# Patient Record
Sex: MALE | Race: WHITE | URBAN - NONMETROPOLITAN AREA
[De-identification: names, ages, dates, MRNs, and addresses within clinical notes are randomized per-mention and may not be internally consistent; named-entity substitution may affect disease eponyms.]

---

## 2019-07-18 ENCOUNTER — HISTORICAL (OUTPATIENT)
Dept: ADMINISTRATIVE | Facility: HOSPITAL | Age: 44
End: 2019-07-18

## 2019-07-29 ENCOUNTER — HISTORICAL (OUTPATIENT)
Dept: ADMINISTRATIVE | Facility: HOSPITAL | Age: 44
End: 2019-07-29

## 2020-12-08 ENCOUNTER — HOSPITAL ENCOUNTER (OUTPATIENT)
Dept: MEDSURG UNIT | Facility: HOSPITAL | Age: 45
End: 2020-12-09
Attending: INTERNAL MEDICINE | Admitting: INTERNAL MEDICINE

## 2020-12-08 LAB
ABS NEUT (OLG): 6.85 X10(3)/MCL (ref 2.1–9.2)
ALBUMIN SERPL-MCNC: 3.4 GM/DL (ref 3.5–5)
ALBUMIN/GLOB SERPL: 0.9 RATIO (ref 1.1–2)
ALP SERPL-CCNC: 45 UNIT/L (ref 40–150)
ALT SERPL-CCNC: 10 UNIT/L (ref 0–55)
AST SERPL-CCNC: 20 UNIT/L (ref 5–34)
BASOPHILS # BLD AUTO: 0 X10(3)/MCL (ref 0–0.2)
BASOPHILS NFR BLD AUTO: 0 %
BILIRUB SERPL-MCNC: 0.8 MG/DL
BILIRUBIN DIRECT+TOT PNL SERPL-MCNC: 0.3 MG/DL (ref 0–0.5)
BILIRUBIN DIRECT+TOT PNL SERPL-MCNC: 0.5 MG/DL (ref 0–0.8)
BUN SERPL-MCNC: 18 MG/DL (ref 8.9–20.6)
CALCIUM SERPL-MCNC: 8.8 MG/DL (ref 8.4–10.2)
CHLORIDE SERPL-SCNC: 99 MMOL/L (ref 98–107)
CK MB SERPL-MCNC: 2.8 NG/ML
CK SERPL-CCNC: 102 U/L (ref 30–200)
CO2 SERPL-SCNC: 29 MMOL/L (ref 22–29)
CREAT SERPL-MCNC: 0.83 MG/DL (ref 0.73–1.18)
D DIMER PPP IA.FEU-MCNC: 0.47 MCG/ML FEU
EOSINOPHIL # BLD AUTO: 0.2 X10(3)/MCL (ref 0–0.9)
EOSINOPHIL NFR BLD AUTO: 2 %
ERYTHROCYTE [DISTWIDTH] IN BLOOD BY AUTOMATED COUNT: 12.6 % (ref 11.5–14.5)
GLOBULIN SER-MCNC: 3.6 GM/DL (ref 2.4–3.5)
GLUCOSE SERPL-MCNC: 137 MG/DL (ref 74–100)
HCO3 UR-SCNC: 30.5 MMOL/L (ref 22–26)
HCT VFR BLD AUTO: 34.4 % (ref 40–51)
HGB BLD-MCNC: 11.3 GM/DL (ref 13.5–17.5)
IMM GRANULOCYTES # BLD AUTO: 0.03 10*3/UL
IMM GRANULOCYTES NFR BLD AUTO: 0 %
LYMPHOCYTES # BLD AUTO: 1.3 X10(3)/MCL (ref 0.6–4.6)
LYMPHOCYTES NFR BLD AUTO: 14 %
MCH RBC QN AUTO: 30.8 PG (ref 26–34)
MCHC RBC AUTO-ENTMCNC: 32.8 GM/DL (ref 31–37)
MCV RBC AUTO: 93.7 FL (ref 80–100)
MONOCYTES # BLD AUTO: 1 X10(3)/MCL (ref 0.1–1.3)
MONOCYTES NFR BLD AUTO: 10 %
NEUTROPHILS # BLD AUTO: 6.85 X10(3)/MCL (ref 2.1–9.2)
NEUTROPHILS NFR BLD AUTO: 72 %
O2 HGB ARTERIAL: 87.4 % (ref 94–100)
PCO2 BLDA: 46 MMHG (ref 35–45)
PH SMN: 7.43 [PH] (ref 7.35–7.45)
PLATELET # BLD AUTO: 252 X10(3)/MCL (ref 130–400)
PMV BLD AUTO: 9.5 FL (ref 7.4–10.4)
PO2 BLDA: 50 MMHG (ref 80–100)
POC ALLENS TEST: POSITIVE
POC BE: 5.4 (ref -2–2)
POC CO HGB: 4.1 %
POC CO2: 31.9 MMOL/L (ref 22–26)
POC MET HGB: 0.9 % (ref 0.4–1.5)
POC SAMPLESOURCE: ABNORMAL
POC SATURATED O2: 92 %
POC SITE: ABNORMAL
POC THB: 12 GM/DL (ref 12–16)
POC TREATMENT: ABNORMAL
POTASSIUM SERPL-SCNC: 3.6 MMOL/L (ref 3.5–5.1)
PROT SERPL-MCNC: 7 GM/DL (ref 6.4–8.3)
RBC # BLD AUTO: 3.67 X10(6)/MCL (ref 4.5–5.9)
SARS-COV-2 RNA RESP QL NAA+PROBE: NOT DETECTED
SODIUM SERPL-SCNC: 136 MMOL/L (ref 136–145)
TROPONIN I SERPL-MCNC: <0.01 NG/ML (ref 0–0.04)
WBC # SPEC AUTO: 9.5 X10(3)/MCL (ref 4.5–11)

## 2020-12-09 LAB
ABS NEUT (OLG): 6.02 X10(3)/MCL (ref 2.1–9.2)
ALBUMIN SERPL-MCNC: 3.3 GM/DL (ref 3.5–5)
ALBUMIN/GLOB SERPL: 0.9 RATIO (ref 1.1–2)
ALP SERPL-CCNC: 51 UNIT/L (ref 40–150)
ALT SERPL-CCNC: 12 UNIT/L (ref 0–55)
AMPHET UR QL SCN: POSITIVE
APPEARANCE, UA: CLEAR
AST SERPL-CCNC: 18 UNIT/L (ref 5–34)
BACTERIA #/AREA URNS AUTO: ABNORMAL /HPF
BARBITURATE SCN PRESENT UR: NEGATIVE
BASOPHILS NFR BLD MANUAL: 1 %
BENZODIAZ UR QL SCN: NEGATIVE
BILIRUB SERPL-MCNC: 0.6 MG/DL
BILIRUB UR QL STRIP: NEGATIVE
BILIRUBIN DIRECT+TOT PNL SERPL-MCNC: 0.3 MG/DL (ref 0–0.5)
BILIRUBIN DIRECT+TOT PNL SERPL-MCNC: 0.3 MG/DL (ref 0–0.8)
BUN SERPL-MCNC: 12 MG/DL (ref 8.9–20.6)
CALCIUM SERPL-MCNC: 8.9 MG/DL (ref 8.4–10.2)
CANNABINOIDS UR QL SCN: NEGATIVE
CHLORIDE SERPL-SCNC: 103 MMOL/L (ref 98–107)
CO2 SERPL-SCNC: 26 MMOL/L (ref 22–29)
COCAINE UR QL SCN: NEGATIVE
COLOR UR: ABNORMAL
CREAT SERPL-MCNC: 0.72 MG/DL (ref 0.73–1.18)
EOSINOPHIL NFR BLD MANUAL: 0 %
ERYTHROCYTE [DISTWIDTH] IN BLOOD BY AUTOMATED COUNT: 12.6 % (ref 11.5–14.5)
GLOBULIN SER-MCNC: 3.7 GM/DL (ref 2.4–3.5)
GLUCOSE (UA): NEGATIVE
GLUCOSE SERPL-MCNC: 149 MG/DL (ref 74–100)
GRANULOCYTES NFR BLD MANUAL: 88 % (ref 43–75)
HCO3 UR-SCNC: 27.9 MMOL/L (ref 22–26)
HCT VFR BLD AUTO: 36 % (ref 40–51)
HGB BLD-MCNC: 11.8 GM/DL (ref 13.5–17.5)
HGB UR QL STRIP: NEGATIVE
HIV 1+2 AB+HIV1 P24 AG SERPL QL IA: NONREACTIVE
HYALINE CASTS #/AREA URNS LPF: ABNORMAL /LPF
IRON SATN MFR SERPL: 21 % (ref 20–50)
IRON SERPL-MCNC: 35 UG/DL (ref 65–175)
KETONES UR QL STRIP: 40 MG/DL
LEUKOCYTE ESTERASE UR QL STRIP: NEGATIVE
LYMPHOCYTES NFR BLD MANUAL: 4 % (ref 20.5–51.1)
MAGNESIUM SERPL-MCNC: 1.74 MG/DL (ref 1.6–2.6)
MCH RBC QN AUTO: 30.9 PG (ref 26–34)
MCHC RBC AUTO-ENTMCNC: 32.8 GM/DL (ref 31–37)
MCV RBC AUTO: 94.2 FL (ref 80–100)
MONOCYTES NFR BLD MANUAL: 4 % (ref 2–9)
NEUTS BAND NFR BLD MANUAL: 3 % (ref 0–10)
NITRITE UR QL STRIP: NEGATIVE
O2 HGB ARTERIAL: 96 % (ref 94–100)
OPIATES UR QL SCN: POSITIVE
PCO2 BLDA: 42 MMHG (ref 35–45)
PCP UR QL: NEGATIVE
PH SMN: 7.43 [PH] (ref 7.35–7.45)
PH UR STRIP.AUTO: 6 [PH] (ref 3–11)
PH UR STRIP: 7.5 [PH] (ref 4.5–8)
PHOSPHATE SERPL-MCNC: 2.9 MG/DL (ref 2.3–4.7)
PLATELET # BLD AUTO: 261 X10(3)/MCL (ref 130–400)
PLATELET # BLD EST: ADEQUATE 10*3/UL
PMV BLD AUTO: 10.2 FL (ref 7.4–10.4)
PO2 BLDA: 207 MMHG (ref 80–100)
POC ALLENS TEST: POSITIVE
POC BE: 3.2 (ref -2–2)
POC CO HGB: 2.4 %
POC CO2: 29.2 MMOL/L (ref 22–26)
POC MET HGB: 1.3 % (ref 0.4–1.5)
POC SAMPLESOURCE: ABNORMAL
POC SATURATED O2: 99.7 %
POC SITE: ABNORMAL
POC THB: 12.1 GM/DL (ref 12–16)
POC TREATMENT: ABNORMAL
POTASSIUM SERPL-SCNC: 4.2 MMOL/L (ref 3.5–5.1)
PROT SERPL-MCNC: 7 GM/DL (ref 6.4–8.3)
PROT UR QL STRIP: NEGATIVE
RBC # BLD AUTO: 3.82 X10(6)/MCL (ref 4.5–5.9)
RBC #/AREA URNS AUTO: ABNORMAL /HPF
RBC MORPH BLD: NORMAL
SETTING 1 ABG: ABNORMAL
SETTING 2 ABG: ABNORMAL
SETTING 3 ABG: ABNORMAL
SODIUM SERPL-SCNC: 137 MMOL/L (ref 136–145)
SP GR UR STRIP: >1.05 (ref 1–1.03)
SQUAMOUS #/AREA URNS LPF: ABNORMAL /LPF
T PALLIDUM AB SER QL: NONREACTIVE
TIBC SERPL-MCNC: 134 UG/DL (ref 69–240)
TIBC SERPL-MCNC: 169 UG/DL (ref 250–450)
TRANSFERRIN SERPL-MCNC: 140 MG/DL (ref 174–364)
UROBILINOGEN UR STRIP-ACNC: NORMAL
WBC # SPEC AUTO: 6.6 X10(3)/MCL (ref 4.5–11)
WBC #/AREA URNS AUTO: ABNORMAL /HPF

## 2020-12-11 LAB — FINAL CULTURE: NO GROWTH

## 2022-04-30 NOTE — ED PROVIDER NOTES
Patient:   Benito Conde             MRN: [id number]            FIN: [id number]               Age:   45 years     Sex:  Male     :  1975   Associated Diagnoses:   SOB (shortness of breath)   Author:   Hellen Petersen      Basic Information   Time seen: Date 2020, Immediately upon arrival.   History source: Patient.   Arrival mode: Private vehicle.   History limitation: None.   Additional information: Chief Complaint from Nursing Triage Note : Chief Complaint   2020 17:45 CST      Chief Complaint           pt c/o cough, sob, chest pain and anxiety, states does IV heroin and meth  .   Provider Assignment.   History of Present Illness   The patient presents with difficulty breathing.  The onset was 2  days ago.  The course/duration of symptoms is constant.  Degree at onset mild.  Degree at present moderate.  The Exacerbating factors is exertion.  The Relieving factors is none.  Risk factors consist of smoking and IV Meth and Heroin user.  Prior episodes: none.  Therapy today: none.  Associated symptoms: chest pain, fever, chills, cough, denies nausea, denies vomiting, denies abdominal pain, denies back pain and denies weight gain.  Additional history: 44 y/o  male presents to the ED with complaints of shortness of breath, chest pain, wheezing, cough, fever, and chills x 2 days. States he is a IV heroin and meth user. States he smokes 1 PPD. States he got very short of breath last night and began wheezing and that caused him to have a panic attack. .        Review of Systems   Constitutional symptoms:  Fever, chills.    Skin symptoms:  Rash.   ENMT symptoms:  Nasal congestion, no ear pain, no sore throat, no sinus pain.    Respiratory symptoms:  Shortness of breath, cough, wheezing, No orthopnea,    Cardiovascular symptoms:  No chest pain, no palpitations, no peripheral edema.    Gastrointestinal symptoms:  No abdominal pain, no nausea, no vomiting, no diarrhea.    Musculoskeletal  symptoms:  No back pain, no Muscle pain, no Joint pain.    Neurologic symptoms:  No headache, no dizziness, no weakness.    Psychiatric symptoms:  Anxiety, substance abuse, No depression,       Health Status   Allergies:    Allergic Reactions (All)  No Known Allergies,    Allergies (1) Active Reaction  No Known Allergies None Documented  .      Past Medical/ Family/ Social History   Medical history:    No active or resolved past medical history items have been selected or recorded., Reviewed as documented in chart.   Surgical history:    No active procedure history items have been selected or recorded., Reviewed as documented in chart.   Family history:    No family history items have been selected or recorded., Reviewed as documented in chart.   Social history:    Social & Psychosocial Habits    Substance Use  06/29/2019  Type: Heroin, Methamphetamines    Has drug use interfered with your work or home life? Yes    Tobacco  06/29/2019  Use: 10 or more cigarettes (1/    Patient Wants Consult For Cessation Counseling No    12/08/2020  Use: 10 or more cigarettes (1/    Patient Wants Consult For Cessation Counseling No    Abuse/Neglect  06/29/2019  SHX Any signs of abuse or neglect No    12/08/2020  SHX Any signs of abuse or neglect No  , Reviewed as documented in chart.   Problem list:    Active Problems (2)  Drug abuse   Tobacco user   .      Physical Examination               Vital Signs   Vital Signs   12/8/2020 18:30 CST      Peripheral Pulse Rate     90 bpm                             Respiratory Rate          21 br/min                             SpO2                      95 %                             Oxygen Therapy            Room air                             Systolic Blood Pressure   109 mmHg                             Diastolic Blood Pressure  65 mmHg                             Mean Arterial Pressure, Cuff              80 mmHg    12/8/2020 17:45 CST      Temperature Temporal Artery               36.9  DegC                             Peripheral Pulse Rate     105 bpm  HI                             Respiratory Rate          20 br/min                             SpO2                      96 %                             Oxygen Therapy            Room air                             Systolic Blood Pressure   104 mmHg                             Diastolic Blood Pressure  71 mmHg  .      Vital Signs (last 24 hrs)_____  Last Charted___________  Heart Rate Peripheral   90 bpm  (DEC 08 18:30)  Resp Rate         21 br/min  (DEC 08 18:30)  SBP      109 mmHg  (DEC 08 18:30)  DBP      65 mmHg  (DEC 08 18:30)  SpO2      95 %  (DEC 08 18:30)  Weight      62 kg  (DEC 08 17:45)  Height      178 cm  (DEC 08 17:45)  BMI      19.57  (DEC 08 17:45)  .   Measurements   12/8/2020 17:45 CST      Weight Dosing             62 kg                             Weight Measured           62 kg                             Weight Measured and Calculated in Lbs     136.69 lb                             Weight Estimated          62 kg                             Height/Length Dosing      178 cm                             Height/Length Measured    178 cm                             Height/Length Estimated   178 cm                             Body Mass Index Estimated 19.57 kg/m2                             Body Mass Index Measured  19.57 kg/m2  .   Basic Oxygen Information   12/8/2020 18:30 CST      SpO2                      95 %                             Oxygen Therapy            Room air    12/8/2020 17:45 CST      SpO2                      96 %                             Oxygen Therapy            Room air  .   General:  Alert, no acute distress.    Skin:  Warm, intact, normal for ethnicity.    Head:  Normocephalic, atraumatic.    Neck:  Supple, trachea midline, no tenderness, no JVD.    Eye:  Pupils are equal, round and reactive to light.   Ears, nose, mouth and throat:  Tympanic membranes clear, oral mucosa moist, no pharyngeal erythema or  exudate.    Cardiovascular:  Regular rate and rhythm, No murmur, Normal peripheral perfusion, No edema.    Respiratory:  Respirations are non-labored, breath sounds are equal, Symmetrical chest wall expansion, Breath sounds: Wheezes present (moderate, expiratory wheezes).    Gastrointestinal:  Soft, Nontender, Non distended, Normal bowel sounds.    Back:  Nontender, Normal range of motion.    Neurological:  Alert and oriented to person, place, time, and situation, No focal neurological deficit observed.       Medical Decision Making   Documents reviewed:  Emergency department nurses' notes.   Electrocardiogram:  Time 12/8/2020 17:53:00, rate 90, normal sinus rhythm, No ST-T changes, no ectopy.    Results review:  Lab results : Lab View   12/8/2020 19:02 CST      Est Creat Clearance Ser   115.11 mL/min    12/8/2020 18:30 CST      Sodium Lvl                136 mmol/L                             Potassium Lvl             3.6 mmol/L                             Chloride                  99 mmol/L                             CO2                       29 mmol/L                             Calcium Lvl               8.8 mg/dL                             Glucose Lvl               137 mg/dL  HI                             BUN                       18.0 mg/dL                             Creatinine                0.83 mg/dL                             eGFR-AA                   >105                             eGFR-MONA                  >105 mL/min/1.73 m2                             Bili Total                0.8 mg/dL                             Bili Direct               0.3 mg/dL                             Bili Indirect             0.50 mg/dL                             AST                       20 unit/L                             ALT                       10 unit/L                             Alk Phos                  45 unit/L                             Total Protein             7.0 gm/dL                              Albumin Lvl               3.4 gm/dL  LOW                             Globulin                  3.6 gm/dL  HI                             A/G Ratio                 0.9 ratio  LOW                             Total CK                  102 U/L                             CK MB                     2.8 ng/mL                             Troponin-I                <0.010 ng/mL                             D-Dimer                   0.47 mcg/ml FEU                             WBC                       9.5 x10(3)/mcL                             RBC                       3.67 x10(6)/mcL  LOW                             Hgb                       11.3 gm/dL  LOW                             Hct                       34.4 %  LOW                             Platelet                  252 x10(3)/mcL                             MCV                       93.7 fL                             MCH                       30.8 pg                             MCHC                      32.8 gm/dL                             RDW                       12.6 %                             MPV                       9.5 fL                             Abs Neut                  6.85 x10(3)/mcL                             Neutro Auto               72 %  NA                             Lymph Auto                14 %  NA                             Mono Auto                 10 %  NA                             Eos Auto                  2 %  NA                             Abs Eos                   0.2 x10(3)/mcL                             Basophil Auto             0 %  NA                             Abs Neutro                6.85 x10(3)/mcL                             Abs Lymph                 1.3 x10(3)/mcL                             Abs Mono                  1.0 x10(3)/mcL                             Abs Baso                  0.0 x10(3)/mcL                             IG%                       0 %  NA                             IG#                        0.030  NA  ,    Labs (Last four charted values)  WBC                  9.5 (DEC 08)   Hgb                  L 11.3 (DEC 08)   Hct                  L 34.4 (DEC 08)   Plt                  252 (DEC 08)   Na                   136 (DEC 08)   K                    3.6 (DEC 08)   CO2                  29 (DEC 08)   Cl                   99 (DEC 08)   Cr                   0.83 (DEC 08)   BUN                  18.0 (DEC 08)   Glucose Random       H 137 (DEC 08) .    Radiology results:  Reported at  12/8/2020 18:31:00, X-ray, chest, reviewed radiologist's report, interpretation:  No acute cardiopulmonary process, Rad Results (ST)  < 12 hrs   Accession: [id number]  Order: XR Chest 1 View  Report Dt/Tm: 12/08/2020 18:31  Report:   EXAM: XR Chest 1 View  DATE: 12/8/2020 6:26 PM CST  INDICATION: Cough     COMPARISON: Chest radiograph 4/20/2020     TECHNIQUE: Frontal views of the chest.        FINDINGS:   The cardiomediastinal silhouette is normal in size and contour.  Pulmonary vascularity is within normal limits. The lungs are  well-inflated and are without focal consolidation, pleural effusion,  or pneumothorax.     The visualized upper abdominal gas pattern is within normal limits.  The imaged osseous structures and soft tissues are without acute  abnormality and are stable in appearance.        IMPRESSION:  No acute cardiopulmonary process.    .       Reexamination/ Reevaluation   Vital signs   Basic Oxygen Information   12/8/2020 18:30 CST      SpO2                      95 %                             Oxygen Therapy            Room air    12/8/2020 17:45 CST      SpO2                      96 %                             Oxygen Therapy            Room air     Interventions: Order Profile (Selected)   Inpatient Orders  Completed  Narcan: 1.2 mg, form: Injection, Nasal, Once, first dose 12/08/20 20:26:00 CST, stop date 12/08/20 20:26:00 CST, STAT  albuterol 90 mcg/inh inhalation aerosol: 8 puff(s), 720 mcg =, form: Inhaler, INH,  Once, first dose 12/08/20 19:26:00 CST, stop date 12/08/20 19:26:00 CST, STAT, Waste Code SP.      Impression and Plan   Diagnosis   SOB (shortness of breath) (CFC77-MS R06.02)   Plan   Condition: Stable.    Disposition: Patient care transitioned to (Transitioned patient to Dr. Raines at 21:23): Time: 12/8/2020 19:00:00, Hellen Petersen.

## 2022-04-30 NOTE — ED PROVIDER NOTES
Patient:   Benito Conde             MRN: [id number]            FIN: [id number]               Age:   45 years     Sex:  Male     :  1975   Associated Diagnoses:   None   Author:   Nadja Currie PA-C      Basic Information   Time seen: Immediately upon arrival.   History source: Patient.   Arrival mode: Private vehicle.   History limitation: None.   Additional information: Chief Complaint from Nursing Triage Note : Chief Complaint   2020 17:45 CST      Chief Complaint           pt c/o cough, sob, chest pain and anxiety, states does IV heroin and meth  .   Provider/Visit info:   Time Seen:  Nadja Currie PA-C / 2020 17:40  .   History of Present Illness   The patient presents with difficulty breathing.  The onset was 2  days ago.  The course/duration of symptoms is constant.  Degree at onset mild.  Degree at present moderate.  The Exacerbating factors is exertion.  The Relieving factors is none.  Risk factors consist of smoking and IV Meth and Heroin user.  Prior episodes: none.  Therapy today: none.  Associated symptoms: chest pain, fever, chills, cough, denies nausea, denies vomiting, denies abdominal pain, denies back pain and denies weight gain.  Additional history: 44 y/o  male presents to the ED with complaints of shortness of breath, chest pain, wheezing, cough, fever, and chills x 2 days. States he is a IV heroin and meth user. States he smokes 1 PPD. States he got very short of breath last night and began wheezing and that caused him to have a panic attack. .        Review of Systems   Constitutional symptoms:  Fever, chills.    Skin symptoms:  Rash.   ENMT symptoms:  Nasal congestion, no ear pain, no sore throat, no sinus pain.    Respiratory symptoms:  Shortness of breath, cough, wheezing, No orthopnea,    Cardiovascular symptoms:  No chest pain, no palpitations, no peripheral edema.    Gastrointestinal symptoms:  No abdominal pain, no nausea, no  vomiting, no diarrhea.    Musculoskeletal symptoms:  No back pain, no Muscle pain, no Joint pain.    Neurologic symptoms:  No headache, no dizziness, no weakness.    Psychiatric symptoms:  Anxiety, substance abuse, No depression,       Health Status   Allergies:    Allergic Reactions (All)  No Known Allergies,    Allergies (1) Active Reaction  No Known Allergies None Documented  .      Past Medical/ Family/ Social History   Medical history:    No active or resolved past medical history items have been selected or recorded., Reviewed as documented in chart.   Surgical history:    No active procedure history items have been selected or recorded., Reviewed as documented in chart.   Family history:    No family history items have been selected or recorded., Reviewed as documented in chart.   Social history:    Social & Psychosocial Habits    Substance Use  06/29/2019  Type: Heroin, Methamphetamines    Has drug use interfered with your work or home life? Yes    Tobacco  06/29/2019  Use: 10 or more cigarettes (1/    Patient Wants Consult For Cessation Counseling No    12/08/2020  Use: 10 or more cigarettes (1/    Patient Wants Consult For Cessation Counseling No    Abuse/Neglect  06/29/2019  SHX Any signs of abuse or neglect No    12/08/2020  SHX Any signs of abuse or neglect No  , Reviewed as documented in chart.   Problem list:    Active Problems (2)  Drug abuse   Tobacco user   .      Physical Examination               Vital Signs   Vital Signs   12/8/2020 18:30 CST      Peripheral Pulse Rate     90 bpm                             Respiratory Rate          21 br/min                             SpO2                      95 %                             Oxygen Therapy            Room air                             Systolic Blood Pressure   109 mmHg                             Diastolic Blood Pressure  65 mmHg                             Mean Arterial Pressure, Cuff              80 mmHg    12/8/2020 17:45 CST       Temperature Temporal Artery               36.9 DegC                             Peripheral Pulse Rate     105 bpm  HI                             Respiratory Rate          20 br/min                             SpO2                      96 %                             Oxygen Therapy            Room air                             Systolic Blood Pressure   104 mmHg                             Diastolic Blood Pressure  71 mmHg  .      Vital Signs (last 24 hrs)_____  Last Charted___________  Heart Rate Peripheral   90 bpm  (DEC 08 18:30)  Resp Rate         21 br/min  (DEC 08 18:30)  SBP      109 mmHg  (DEC 08 18:30)  DBP      65 mmHg  (DEC 08 18:30)  SpO2      95 %  (DEC 08 18:30)  Weight      62 kg  (DEC 08 17:45)  Height      178 cm  (DEC 08 17:45)  BMI      19.57  (DEC 08 17:45)  .   Measurements   12/8/2020 17:45 CST      Weight Dosing             62 kg                             Weight Measured           62 kg                             Weight Measured and Calculated in Lbs     136.69 lb                             Weight Estimated          62 kg                             Height/Length Dosing      178 cm                             Height/Length Measured    178 cm                             Height/Length Estimated   178 cm                             Body Mass Index Estimated 19.57 kg/m2                             Body Mass Index Measured  19.57 kg/m2  .   Basic Oxygen Information   12/8/2020 18:30 CST      SpO2                      95 %                             Oxygen Therapy            Room air    12/8/2020 17:45 CST      SpO2                      96 %                             Oxygen Therapy            Room air  .   General:  Alert, no acute distress.    Skin:  Warm, intact, normal for ethnicity.    Head:  Normocephalic, atraumatic.    Neck:  Supple, trachea midline, no tenderness, no JVD.    Eye:  Pupils are equal, round and reactive to light.   Ears, nose, mouth and throat:  Tympanic membranes  clear, oral mucosa moist, no pharyngeal erythema or exudate.    Cardiovascular:  Regular rate and rhythm, No murmur, Normal peripheral perfusion, No edema.    Respiratory:  Respirations are non-labored, breath sounds are equal, Symmetrical chest wall expansion, Breath sounds: Wheezes present (moderate, expiratory wheezes).    Gastrointestinal:  Soft, Nontender, Non distended, Normal bowel sounds.    Back:  Nontender, Normal range of motion.    Neurological:  Alert and oriented to person, place, time, and situation, No focal neurological deficit observed.       Medical Decision Making   Documents reviewed:  Emergency department nurses' notes.   Electrocardiogram:  Time 12/8/2020 17:53:00, rate 90, normal sinus rhythm, No ST-T changes, no ectopy.    Results review:     No qualifying data available.      Reexamination/ Reevaluation   Vital signs   Basic Oxygen Information   12/8/2020 18:30 CST      SpO2                      95 %                             Oxygen Therapy            Room air    12/8/2020 17:45 CST      SpO2                      96 %                             Oxygen Therapy            Room air        Impression and Plan   Plan   Condition: Stable.    Disposition: Medically cleared, Patient care transitioned to: Time: 12/8/2020 19:00:00, Hellen Petersen.    Counseled: Patient, Regarding diagnosis, Regarding diagnostic results, Regarding treatment plan, Regarding prescription, Patient indicated understanding of instructions.

## 2022-04-30 NOTE — ED PROVIDER NOTES
Patient:   Benito Conde             MRN: [id number]            FIN: [id number]               Age:   45 years     Sex:  Male     :  1975   Associated Diagnoses:   SOB (shortness of breath); Acute bronchitis; Hypoxemia   Author:   Mal Raines MD      Basic Information   Time seen: Date 2020, Immediately upon arrival.   History source: Patient.   Arrival mode: Private vehicle.   History limitation: None.   Additional information: Chief Complaint from Nursing Triage Note : Chief Complaint   2020 17:45 CST      Chief Complaint           pt c/o cough, sob, chest pain and anxiety, states does IV heroin and meth  .   Provider Assignment.   History of Present Illness   The patient presents with difficulty breathing.  The onset was 2  days ago.  The course/duration of symptoms is constant.  Degree at onset mild.  Degree at present moderate.  The Exacerbating factors is exertion.  The Relieving factors is none.  Risk factors consist of smoking and IV Meth and Heroin user.  Prior episodes: none.  Therapy today: none.  Associated symptoms: chest pain, fever, chills, cough, denies nausea, denies vomiting, denies abdominal pain, denies back pain and denies weight gain.  Additional history: 46 y/o  male presents to the ED with complaints of shortness of breath, chest pain, wheezing, cough, fever, and chills x 2 days. States he is a IV heroin and meth user. States he smokes 1 PPD. States he got very short of breath last night and began wheezing and that caused him to have a panic attack. .        Review of Systems   Constitutional symptoms:  Fever, chills.    Skin symptoms:  Rash.   ENMT symptoms:  Nasal congestion, no ear pain, no sore throat, no sinus pain.    Respiratory symptoms:  Shortness of breath, cough, wheezing, No orthopnea,    Cardiovascular symptoms:  No chest pain, no palpitations, no peripheral edema.    Gastrointestinal symptoms:  No abdominal pain, no nausea, no  vomiting, no diarrhea.    Musculoskeletal symptoms:  No back pain, no Muscle pain, no Joint pain.    Neurologic symptoms:  No headache, no dizziness, no weakness.    Psychiatric symptoms:  Anxiety, substance abuse, No depression,       Health Status   Allergies:    Allergic Reactions (All)  No Known Allergies,    Allergies (1) Active Reaction  No Known Allergies None Documented  .   Medications:  (Selected)   Inpatient Medications  Ordered  DuoNeb 0.5 mg-2.5 mg/3 mL inhalation solution: 9 mL, form: Soln, NEB, Now, first dose 12/08/20 21:34:00 CST, stop date 12/08/20 21:34:00 CST, STAT  IVF Normal Saline NS Bolus 1000ml: 1,000 mL, 1,000 mL, IV, Once, 1,000 mL/hr, start date 12/08/20 21:35:00 CST, stop date 12/08/20 21:35:00 CST, STAT.      Past Medical/ Family/ Social History   Medical history:    No active or resolved past medical history items have been selected or recorded., Reviewed as documented in chart.   Surgical history:    No active procedure history items have been selected or recorded., Reviewed as documented in chart.   Family history:    No family history items have been selected or recorded., Reviewed as documented in chart.   Social history:    Social & Psychosocial Habits    Substance Use  06/29/2019  Type: Heroin, Methamphetamines    Has drug use interfered with your work or home life? Yes    Tobacco  06/29/2019  Use: 10 or more cigarettes (1/    Patient Wants Consult For Cessation Counseling No    12/08/2020  Use: 10 or more cigarettes (1/    Patient Wants Consult For Cessation Counseling No    Abuse/Neglect  06/29/2019  SHX Any signs of abuse or neglect No    12/08/2020  SHX Any signs of abuse or neglect No  , Reviewed as documented in chart.   Problem list:    Active Problems (2)  Drug abuse   Tobacco user   .      Physical Examination               Vital Signs   Vital Signs   12/8/2020 21:05 CST      SpO2                      93 %  LOW    12/8/2020 20:42 CST      Peripheral Pulse Rate     92 bpm                              Respiratory Rate          17 br/min                             SpO2                      94 %                             Oxygen Therapy            Room air    12/8/2020 20:19 CST      Peripheral Pulse Rate     84 bpm                             Respiratory Rate          19 br/min                             SpO2                      93 %  LOW                             Oxygen Therapy            Room air                             Systolic Blood Pressure   100 mmHg                             Diastolic Blood Pressure  60 mmHg                             Mean Arterial Pressure, Cuff              73 mmHg    12/8/2020 19:26 CST      Peripheral Pulse Rate     92 bpm                             SpO2                      93 %  LOW                             Systolic Blood Pressure   123 mmHg                             Diastolic Blood Pressure  73 mmHg    12/8/2020 18:30 CST      Peripheral Pulse Rate     90 bpm                             Respiratory Rate          21 br/min                             SpO2                      95 %                             Oxygen Therapy            Room air                             Systolic Blood Pressure   109 mmHg                             Diastolic Blood Pressure  65 mmHg                             Mean Arterial Pressure, Cuff              80 mmHg    12/8/2020 17:45 CST      Temperature Temporal Artery               36.9 DegC                             Peripheral Pulse Rate     105 bpm  HI                             Respiratory Rate          20 br/min                             SpO2                      96 %                             Oxygen Therapy            Room air                             Systolic Blood Pressure   104 mmHg                             Diastolic Blood Pressure  71 mmHg  .      Vital Signs (last 24 hrs)_____  Last Charted___________  Heart Rate Peripheral   92 bpm  (DEC 08 20:42)  Resp Rate         17 br/min  (DEC 08  20:42)  SBP      100 mmHg  (DEC 08 20:19)  DBP      60 mmHg  (DEC 08 20:19)  SpO2      L 93%  (DEC 08 21:05)  Weight      62 kg  (DEC 08 17:45)  Height      178 cm  (DEC 08 17:45)  BMI      19.57  (DEC 08 17:45)  .   Measurements   12/8/2020 17:45 CST      Weight Dosing             62 kg                             Weight Measured           62 kg                             Weight Measured and Calculated in Lbs     136.69 lb                             Weight Estimated          62 kg                             Height/Length Dosing      178 cm                             Height/Length Measured    178 cm                             Height/Length Estimated   178 cm                             Body Mass Index Estimated 19.57 kg/m2                             Body Mass Index Measured  19.57 kg/m2  .   Basic Oxygen Information   12/8/2020 21:05 CST      SpO2                      93 %  LOW    12/8/2020 20:42 CST      SpO2                      94 %                             Oxygen Therapy            Room air    12/8/2020 20:19 CST      SpO2                      93 %  LOW                             Oxygen Therapy            Room air    12/8/2020 19:26 CST      SpO2                      93 %  LOW    12/8/2020 18:30 CST      SpO2                      95 %                             Oxygen Therapy            Room air    12/8/2020 17:45 CST      SpO2                      96 %                             Oxygen Therapy            Room air  .   General:  Alert, no acute distress.    Skin:  Warm, intact, normal for ethnicity.    Head:  Normocephalic, atraumatic.    Neck:  Supple, trachea midline, no tenderness, no JVD.    Eye:  Pupils are equal, round and reactive to light.   Ears, nose, mouth and throat:  Tympanic membranes clear, oral mucosa moist, no pharyngeal erythema or exudate.    Cardiovascular:  Regular rate and rhythm, No murmur, Normal peripheral perfusion, No edema.    Respiratory:  Respirations are non-labored,  breath sounds are equal, Symmetrical chest wall expansion, Breath sounds: Wheezes present (moderate, expiratory wheezes).    Gastrointestinal:  Soft, Nontender, Non distended, Normal bowel sounds.    Back:  Nontender, Normal range of motion.    Neurological:  Alert and oriented to person, place, time, and situation, No focal neurological deficit observed.       Medical Decision Making   Documents reviewed:  Emergency department nurses' notes.   Electrocardiogram:  Time 12/8/2020 17:53:00, rate 90, normal sinus rhythm, No ST-T changes, no ectopy.    Results review:  Lab results : Lab View   12/8/2020 19:02 CST      Est Creat Clearance Ser   115.11 mL/min    12/8/2020 18:30 CST      Sodium Lvl                136 mmol/L                             Potassium Lvl             3.6 mmol/L                             Chloride                  99 mmol/L                             CO2                       29 mmol/L                             Calcium Lvl               8.8 mg/dL                             Glucose Lvl               137 mg/dL  HI                             BUN                       18.0 mg/dL                             Creatinine                0.83 mg/dL                             eGFR-AA                   >105                             eGFR-MONA                  >105 mL/min/1.73 m2                             Bili Total                0.8 mg/dL                             Bili Direct               0.3 mg/dL                             Bili Indirect             0.50 mg/dL                             AST                       20 unit/L                             ALT                       10 unit/L                             Alk Phos                  45 unit/L                             Total Protein             7.0 gm/dL                             Albumin Lvl               3.4 gm/dL  LOW                             Globulin                  3.6 gm/dL  HI                             A/G Ratio                  0.9 ratio  LOW                             Total CK                  102 U/L                             CK MB                     2.8 ng/mL                             Troponin-I                <0.010 ng/mL                             D-Dimer                   0.47 mcg/ml FEU                             WBC                       9.5 x10(3)/mcL                             RBC                       3.67 x10(6)/mcL  LOW                             Hgb                       11.3 gm/dL  LOW                             Hct                       34.4 %  LOW                             Platelet                  252 x10(3)/mcL                             MCV                       93.7 fL                             MCH                       30.8 pg                             MCHC                      32.8 gm/dL                             RDW                       12.6 %                             MPV                       9.5 fL                             Abs Neut                  6.85 x10(3)/mcL                             Neutro Auto               72 %  NA                             Lymph Auto                14 %  NA                             Mono Auto                 10 %  NA                             Eos Auto                  2 %  NA                             Abs Eos                   0.2 x10(3)/mcL                             Basophil Auto             0 %  NA                             Abs Neutro                6.85 x10(3)/mcL                             Abs Lymph                 1.3 x10(3)/mcL                             Abs Mono                  1.0 x10(3)/mcL                             Abs Baso                  0.0 x10(3)/mcL                             IG%                       0 %  NA                             IG#                       0.030  NA  , Lab results : Lab [facility]   12/8/2020 21:40 CST      Sample ABG                arterial                             Treatment                 room air                              Site                      Radial Rt                             pH Art                    7.43                             pCO2 Art                  46.0 mmHg  HI                             pO2 Art                   50.0 mmHg  CRIT                             HCO3 Art                  30.5 mmol/L  HI                             CO2 Totl Art              31.9 mmol/L  HI                             O2 Sat Art                92.0 %                             D base                    5.4  HI                             THB ABG                   12.0 gm/dL                             CO Hgb                    4.1 %  NA                             Met Hgb Art               0.9 %                             O2 Hgb                    87.4 %  LOW                             Allens                    Positive  , Lab results : Lab [facility]   12/8/2020 18:29 CST      SARS-CoV-2 PCR            Not Detected  .    Radiology results:  Reported at  12/8/2020 18:31:00, X-ray, chest, reviewed radiologist's report, interpretation:  No acute cardiopulmonary process, Rad Results (ST)  < 12 hrs   Accession: [id number]  Order: XR Chest 1 View  Report Dt/Tm: 12/08/2020 18:31  Report:   EXAM: XR Chest 1 View  DATE: 12/8/2020 6:26 PM CST  INDICATION: Cough     COMPARISON: Chest radiograph 4/20/2020     TECHNIQUE: Frontal views of the chest.        FINDINGS:   The cardiomediastinal silhouette is normal in size and contour.  Pulmonary vascularity is within normal limits. The lungs are  well-inflated and are without focal consolidation, pleural effusion,  or pneumothorax.     The visualized upper abdominal gas pattern is within normal limits.  The imaged osseous structures and soft tissues are without acute  abnormality and are stable in appearance.        IMPRESSION:  No acute cardiopulmonary process.    .       Reexamination/ Reevaluation   Time: 12/8/2020 21:00:00 .   Notes: Care transitioned from [facility] to  myself, Mal Raines MD, at  9 PM.  Patient still has expiratory wheezing and rhonchi throughout the lung fields.  No acute findings on chest x-ray.  Patient's oxygen saturation 87% on room air.  Will obtain an inpatient Covid for further evaluation as well as provide nebulizer treatments and steroids as this appears to be more of COPD related.  We will continue to monitor.  .   Time: 12/8/2020 23:37:00 .   Notes: Patient currently in no acute distress.  Patient saturating well on 3 L.  When moved to room air, oxygen saturation diminishes to 87%.  On patient's ABG, PO2 of 50 consistent with where the patient is currently at clinically.  Patient has expiratory wheezing and rhonchi which appears to be more consistent with bronchitis, but may have some underlying COPD.  Due to persistent hypoxia, internal medicine has been consulted for observation admission..      Impression and Plan   Diagnosis   SOB (shortness of breath) (KDN86-QF R06.02)   Acute bronchitis (LKL01-ZI J20.9)   Hypoxemia (OKN07-LW R09.02)      Calls-Consults   -  12/8/2020 23:37:00 , Internal Medicine, consult.    Plan   Condition: Stable.    Disposition: Admit time  12/8/2020 23:38:00, Admit to Inpatient Telemetry Unit.    Counseled: Patient, Regarding diagnosis, Regarding treatment plan, Regarding prescription, Patient indicated understanding of instructions.

## 2022-05-04 NOTE — HISTORICAL [facility] CERNER
This is a historical note converted from [place]. Formatting and pictures may have been removed.  Please reference [place] for original formatting and attached multimedia. Admit and Discharge Dates  Admit Date: 12/09/2020  Discharge Date: 12/09/2020  Physicians  Attending Physician - Juanjose LINDSEY, Beatrice  Admitting Physician - Juanjose LINDSEY, Beatrice  Primary Care Physician - No PCP, No  Discharge Diagnosis  Acute bronchitis?J20.9  Hypoxemia?R09.02  Shortness of breath?[id number]  SOB (shortness of breath)?R06.02  Admission Information  Patient is a 45-year-old male with history of panic attacks, depression,?and polysubstance abuse presenting to ER with complaints of shortness of breath on day of presentation. ?Patient describes having panic attacks which resolve with 2-3 treatments of albuterol inhaler each week.??Given that patients exacerbations are relieved with albuterol, likely has been diagnosed with panic attacks. ?Patient has never been diagnosed with asthma or COPD. ?Of note patient recently abused heroin and meth on Monday and Tuesday, 1 day prior to ER presentation. On morning of Dec 8,? patient endorses using his albuterol inhaler 8 times without any relief, prompting him to come to the ER.??Patient does endorse chest pain but more so with coughing, productive cough with yellow to green mucus, dizziness upon coughing but without any syncopal episodes. ?Patient has a significant history of abusing heroin on nearly a daily basis for 6-7 years, and abusing meth nearly 1-2 times weekly for the past 10 years. ?In addition has a significant 26-pack-year history of tobacco usage, but negative for vaping. ?In regards to occupational exposure, patient has been a  since his teenage years. ?Patient denies nausea, vomiting, diarrhea, abdominal pain, hematuria, hemoptysis, syncope. ?Medicine consulted for hypoxic hypercapnic?respiratory failure down to 93% on room air requiring 3 L  nasal cannula.?In the ER received one dose of decadron, narcan, and duonebs. Labs unremarkable, troponin negative. UDS positive for opiates and amphetamines.  Hospital Course  Received phone call from nursing that patient wanted to leave AMA. Patient stated that he would eat his breakfast and wait for us to come to see him. Attempted to see patient but patient not in room.? Patient eloped.       Reviewed chart. ?Agree with all the above findings.

## 2022-05-04 NOTE — HISTORICAL [facility] CERNER
This is a historical note converted from [facility]. Formatting and pictures may have been removed.  Please reference [facility] for original formatting and attached multimedia. Chief Complaint  pt c/o cough, sob, chest pain and anxiety, states does IV heroin and meth  History of Present Illness  Patient is a 45-year-old male with history of panic attacks, depression,?and polysubstance abuse presenting to ER with complaints of shortness of breath on day of presentation. ?Patient describes having panic attacks which resolve with 2-3 treatments of albuterol inhaler each week.??Given that patients exacerbations are relieved with albuterol, likely has been diagnosed with panic attacks. ?Patient has never been diagnosed with asthma or COPD. ?Of note patient recently abused heroin and meth on Monday and Tuesday, 1 day prior to ER presentation. On morning of Dec 8,? patient endorses using his albuterol inhaler 8 times without any relief, prompting him to come to the ER.??Patient does endorse chest pain but more so with coughing, productive cough with yellow to green mucus, dizziness upon coughing but without any syncopal episodes. ?Patient has a significant history of abusing heroin on nearly a daily basis for 6-7 years, and abusing meth nearly 1-2 times weekly for the past 10 years. ?In addition has a significant 26-pack-year history of tobacco usage, but negative for vaping. ?In regards to occupational exposure, patient has been a  since his teenage years. ?Patient denies nausea, vomiting, diarrhea, abdominal pain, hematuria, hemoptysis, syncope. ?Medicine consulted for hypoxic hypercapnic?respiratory failure down to 93% on room air requiring 3 L nasal cannula.?In the ER received one dose of decadron, narcan, and duonebs. Labs unremarkable, troponin negative. UDS positve for opiates and amphetamines.  ?  PMH: panic attacks (misdiagnosis), tobacco usage, polysubstance abuse, depression  Past Surgical Hx: none  Fam Hx:  none  Meds: Albuterol inhaler, tylenol PRN, Aspirin; was prescribed lexapro in the past years ago  Social History: nearly daily heroin user for the past 6-7 years; meth use atleast twice weekly for the past 10 years; cocaine and ecstasy last abused as a young adult; no marijuana ; alcohol usage years ago; 26 pack year history for tobacco; no vaping; occupation is welding  ?  Review of Systems  Constitutional:?no fever, fatigue, weakness  Eye:?no vision loss, eye redness, drainage, or pain  ENMT:?no sore throat, ear pain, sinus pain/congestion, nasal congestion/drainage  Respiratory:?+ cough,?+ wheezing,?+ shortness of breath  Cardiovascular:?+ chest pain, no palpitations, no edema  Gastrointestinal:?no nausea, vomiting, or diarrhea. No abdominal pain  Genitourinary:?no dysuria, no urinary frequency or urgency, no hematuria  Hema/Lymph:?no abnormal bruising or bleeding  Endocrine:?no heat or cold intolerance, no excessive thirst or excessive urination  Musculoskeletal:?no muscle or joint pain, no joint swelling  Integumentary:?no skin rash or abnormal lesion  Neurologic: no headache, no dizziness, no weakness or numbness  ?  Physical Exam  Vitals & Measurements  T:?36.3? ?C (Oral)? TMIN:?36.3? ?C (Oral)? TMAX:?37.3? ?C (Oral)? HR:?78(Peripheral)? RR:?16? BP:?118/61? SpO2:?95%? WT:?62?kg? BMI:?19.57?  General:?cachetic thin male in mild distress wearing NC  HENT:?normocephalic, atraumatic  Neck: full range of motion, no thyromegaly  Respiratory:?significant coarse rhonchi and expiratory wheezing bilaterally, coughing on exam  Cardiovascular:?regular rate and rhythm without murmurs, gallops or rubs; no peripheral edema  Gastrointestinal:?soft, non-tender, non-distended with normal bowel sounds, without masses to palpation  Musculoskeletal:?full range of motion of all extremities/spine without limitation or discomfort  Integumentary: no rashes or skin lesions present  Neurologic:? no signs of peripheral neurological  deficit, motor/sensory function intact  ?  Assessment/Plan  Hypercapnic Hypoxic Respiratory Failure req BIPAP  COPD Exacerbation (but no PFTs on file) 2/2 Meth and Heroin Abuse  Chemical Pneumonitis from occupational exposure ()  Tobacco Use Disorder, 26 pack year history  Depression  ?   - no PFTs on file but given significant history of tobacco usage and occupational exposure, would not be surprised if he has undiagnosed COPD, maybe even mixed restrictive and obstructive lung disease given occupational chemical exposures ; will benefit from outpatient PFT and pulmonology referral?  - endorses albuterol usage usually 2-3 times weekly but this admission, abused meth and heroin day prior to admission, and remained dyspneic despite 8 albuterol treatments  - CXR negative for consolidations; but concern for pneumonia remains as patient was c/o green mucus production  - physical exam notable for coarse rhonchi and exp wheezing bilaterally  - ordering CT thorax with and without contrast; evaluating for pulmonary fibrosis versus infectious process  - ordered Solumedrol 125 q8h IV + Day 1 of Levaquin 750 IV q24 + scheduled Duonebs q6h + LR at 125 cc/hr + BIPAP  - cough regimen: tessalon perles PRN , guafineasin syrup , benzocaine lozenges  - ordered respiratory culture and gram stain, flu a and b, mycoplasma PCR; continue BIPAP, follow up repeat ABG  - consult to telepsych placed for evaluation for depression  - consult to case management placed for detox vs rehab placement- continue LR at 100 cc/hr  ?  ?   DVT prophylaxis: Lovenox 40  GI prophylaxis:?Protonix  Antibiotics:?Day 1 Levaquin  Fluids: LR  O2:? BIPAP  ?  ?   Problem List/Past Medical History  Ongoing  Drug abuse  Historical  No qualifying data  Medications  Inpatient  benzocaine-menthol 6 mg-10 mg mucous membrane lozenge, 1 lozenge(s), Oral, q4hr  DuoNeb 0.5 mg-2.5 mg/3 mL inhalation solution, 3 mL, NEB, q6hr Resp  guaifenesin 100 mg/5 mL oral liquid, 200  mg= 10 mL, Oral, q6hr  Influenza Virus Vaccine, Inactivated, 0.5 mL, IM, Daily  IVF Lactated Ringers LR Infusion 1,000 mL, 1000 mL, IV  levofloxacin IVPB ( Levaquin ), 750 mg= 150 mL, IV Piggyback, q24hr  Lovenox, 40 mg= 0.4 mL, Subcutaneous, Daily  Protonix, 40 mg= 1 EA, IV Slow, Daily  SOLU-Medrol, 125 mg= 2 mL, IV Push, q8hr  Tessalon Perles, 100 mg= 1 cap(s), Oral, TID, PRN  Home  No active home medications  Allergies  No Known Allergies  Social History  Abuse/Neglect  No, No, Yes, 12/09/2020  No, 12/08/2020  No, 06/29/2019  Substance Use  Heroin, Methamphetamines, Drug use interferes with work/home: Yes., 06/29/2019  Tobacco  10 or more cigarettes (1/2 pack or more)/day in last 30 days, Yes, 12/09/2020  10 or more cigarettes (1/2 pack or more)/day in last 30 days, No, 12/08/2020  10 or more cigarettes (1/2 pack or more)/day in last 30 days, No, 06/29/2019      Reviewed chart. Discussed assessment and plan with resident, necessary care provided.??Agree with all the above findings.  Patient was not in room during rounds, ? eloped